# Patient Record
(demographics unavailable — no encounter records)

---

## 2024-10-22 NOTE — REVIEW OF SYSTEMS
[Nasal Congestion] : nasal congestion [Postnasal Drip] : postnasal drip [Cough] : cough [Hay Fever] : hay fever [GERD] : gerd [Negative] : Cardiovascular [Sputum] : no sputum [Dyspnea] : no dyspnea [Wheezing] : no wheezing

## 2024-10-22 NOTE — PHYSICAL EXAM
[No Acute Distress] : no acute distress [Normal Oropharynx] : normal oropharynx [III] : Mallampati Class: III [Normal Appearance] : normal appearance [No Neck Mass] : no neck mass [Normal Rate/Rhythm] : normal rate/rhythm [Normal S1, S2] : normal s1, s2 [No Murmurs] : no murmurs [No Resp Distress] : no resp distress [Clear to Auscultation Bilaterally] : clear to auscultation bilaterally [No Abnormalities] : no abnormalities [No Clubbing] : no clubbing [No Edema] : no edema [Oriented x3] : oriented x3 [Normal Affect] : normal affect

## 2024-10-22 NOTE — HISTORY OF PRESENT ILLNESS
[Never] : never [TextBox_4] : 87 year old woman diagnosed in 2002 with Stage 3A left breast cancer, s/p mastectomy, CAF chemotherapy followed by tamoxifen and anastrozole. In 1/2007 she developed bony metastatic disease and was started on therapy under the care of Dr. Lizbeth Garcia at [facility]. Pathology showed ER+MT+/HER2+ disease. She was initiated on Navelbine and Herceptin with Zometa. In 7/2007, Navelbine was held due to sore legs and she was started on Fulvestrant. At some point exemestane was also started.  Zometa was initially given monthly and then changed to every 3 months. She also had RT to the right femur in 2012 In 2014 she was noted to have cerebellar mass for which she underwent resection and adjuvant radiation therapy. It was decided to maintain her systemic treatment due to lack of systemic progression. In Summer 2015 she developed double vision from cranial nerve paralysis and after eye surgery she has less diplopia.  In May/June 2023 she had fall at home in her apartment in [facility] and found to have fracture of the right clavicle. She spent 6 weeks in rehab facility and as of 7/31/23 moved out of her apartment in [facility] and into assisted living facility ([facility]) in [place]. Transferred her care to [facility] (formerly [facility]) as of 8/2023.    LAST MRI brain - 3/2024 BREANNA LAST PET/CT at [facility] in 6/2022; opting to monitor clinically as no change in tumor markers Echo - at [facility] 11/2022 EF 61%; repeat at [facility] 8/7/23 - EF 59%; noted moderate mitral and tricuspid valve regurg; moderate aortic stenosis; mild/moderate aortic regurg as well; seen by Dr. Jackson who recommended f/up every 6 months (due February 2024) Zometa - every 3 months; last in June 2023- due in 10/2023; advised she will need dental clearance which she has not obtained to date Germline mutation testing - BRCA 1/2 negative in 2007; unsure if updated testing done.  Comes in today for initial evaluation of persistent cough, for several months.  Did not start after a URI.  Sometimes initiated by aspiration in event.  Sometimes feels a tickle in her chest.  No wheezing.  Denies shortness of breath. No prior history of asthma.  Never smoked.  .   No new medications.   Has GERD, worse recently.  Has arunny nose and postnasal drip.

## 2024-12-09 NOTE — END OF VISIT
[] : Fellow [FreeTextEntry3] : Patient seen and examined with heme/onc fellow (Dr. Taurus Burgos PGY- 6)

## 2024-12-09 NOTE — HISTORY OF PRESENT ILLNESS
[Date: ____________] : Patient's last distress assessment performed on [unfilled]. [1 - Distress Level] : Distress Level: 1 [100: Normal, no complaints, no evidence of disease.] : 100: Normal, no complaints, no evidence of disease. [ECOG Performance Status: 1 - Restricted in physically strenuous activity but ambulatory and able to carry out work of a light or sedentary nature] : Performance Status: 1 - Restricted in physically strenuous activity but ambulatory and able to carry out work of a light or sedentary nature, e.g., light house work, office work [de-identified] : Patient initilally diagnosed in 2002 with Stage 3A left breast cancer and after mastectomy treated with CAF chemotherapy followed by tamoxifen and anastrozole. In 1/2007 she developed bony metastatic disease and was started on therapy under the care of Dr. Lizbeth Garcia at [facility]. Pathology showed ER+WV+/HER2+ disease. She was initiated on Navelbine and Herceptin with Zometa. In 7/2007, Navelbine was held due to sore legs and she was started on Fulvestrant. At some point exemestane was also started but it is unclear from office notes. Zometa was initially given monthly and then changed to every 3 months. She also had RT to the right femur in 2012 In 2014 she was noted to have cerebellar mass for which she underwent resection and adjuvant radiation therapy. It was decided to maintain her systemic treatment due to lack of systemic progression. In Summer 2015 she developed double vision from cranial nerve paralysis and after eye surgery she has less diplopia.   In May/June 2023 she had fall at home in her apartment in [facility] and found to have fracture of the right clavicle. She spent 6 weeks in rehab facility and as of 7/31/23 moved out of her apartment in [facility] and into assisted living facility ([facility]) in [place]. Transferred her care to  [facility] (formerly [facility]) as of 8/2023  [de-identified] : 12/5/2024 Patient returns today to rule out progression of metastatic breast cancer and to assess treatment toxicity. on Herceptin/Fulvestrant/exemestane since 2007 s/p mediport replacement - working well  She denies any SOB, CP, bone pain, headache. Recent Imaging: =================== LAST MRI brain - 3/2024 BREANNA LAST PET/CT at [facility] in 6/2022; opting to monitor clinically as no change in tumor markers Echo - Last 8/5/2024: EF 56% (stable)%; noted moderate mitral and tricuspid valve regurg; moderate aortic stenosis; mild/moderate aortic regurg as well; seen by Dr. Jackson who recommended f/up every 6 months Zometa - every 3 months;  Germline mutation testing - BRCA 1/2 negative in 2007; unsure if updated testing done

## 2024-12-09 NOTE — HISTORY OF PRESENT ILLNESS
[Date: ____________] : Patient's last distress assessment performed on [unfilled]. [1 - Distress Level] : Distress Level: 1 [100: Normal, no complaints, no evidence of disease.] : 100: Normal, no complaints, no evidence of disease. [ECOG Performance Status: 1 - Restricted in physically strenuous activity but ambulatory and able to carry out work of a light or sedentary nature] : Performance Status: 1 - Restricted in physically strenuous activity but ambulatory and able to carry out work of a light or sedentary nature, e.g., light house work, office work [de-identified] : Patient initilally diagnosed in 2002 with Stage 3A left breast cancer and after mastectomy treated with CAF chemotherapy followed by tamoxifen and anastrozole. In 1/2007 she developed bony metastatic disease and was started on therapy under the care of Dr. Lizbeth Garcia at [facility]. Pathology showed ER+OR+/HER2+ disease. She was initiated on Navelbine and Herceptin with Zometa. In 7/2007, Navelbine was held due to sore legs and she was started on Fulvestrant. At some point exemestane was also started but it is unclear from office notes. Zometa was initially given monthly and then changed to every 3 months. She also had RT to the right femur in 2012 In 2014 she was noted to have cerebellar mass for which she underwent resection and adjuvant radiation therapy. It was decided to maintain her systemic treatment due to lack of systemic progression. In Summer 2015 she developed double vision from cranial nerve paralysis and after eye surgery she has less diplopia.   In May/June 2023 she had fall at home in her apartment in [facility] and found to have fracture of the right clavicle. She spent 6 weeks in rehab facility and as of 7/31/23 moved out of her apartment in [facility] and into assisted living facility ([facility]) in [place]. Transferred her care to  [facility] [place] (formerly [facility]) as of 8/2023  [de-identified] : 12/5/2024 Patient returns today to rule out progression of metastatic breast cancer and to assess treatment toxicity. on Herceptin/Fulvestrant/exemestane since 2007 s/p mediport replacement - working well  She denies any SOB, CP, bone pain, headache. Recent Imaging: =================== LAST MRI brain - 3/2024 BREANNA LAST PET/CT at [facility] in 6/2022; opting to monitor clinically as no change in tumor markers Echo - Last 8/5/2024: EF 56% (stable)%; noted moderate mitral and tricuspid valve regurg; moderate aortic stenosis; mild/moderate aortic regurg as well; seen by Dr. Jackson who recommended f/up every 6 months Zometa - every 3 months;  Germline mutation testing - BRCA 1/2 negative in 2007; unsure if updated testing done

## 2024-12-09 NOTE — ASSESSMENT
[FreeTextEntry1] : 88 yo woman with history of Stage 3A left breast cancer in 2002; developed metastatic disease in 2007 involving bone and cerebellar mets s/p resection in 2014  - has had stable systemic disease on Herceptin/Fulvestrant/exemestane since 2007 - will continue monthly fulvestrant  - will continue monthly Herceptin (was getting injection of Hylecta; agreed to change to Herceptin infusional 6mg/kg dosing); using medi port, however unable to use mediport last treatment and today because unable to obtain blood return.  She is also c/o soreness when saline in flushed through the port. Will need port revision.  - Echo from 8/24 reviewed; with EF 55-60%.  Follows with DR. Frazier next due in 6 months 2/2025 - Continue Faslodex and Herceptin as well as exemestane  - Annual Brain MRI 3/2024 with BREANNA; will hold off repeat PET/CT unless clinically significant changes or rise in tumor markers - Patient had the opportunity to have all their questions answered to their satisfaction - f/u in 2 months or sooner if needed.  GERD/Epigastric Pain - resolved - continue pantoprazole; H2 blocker; Gaviscon; dietary avoidance of chocolate and tomatoes - f/up with GI

## 2024-12-09 NOTE — ASSESSMENT
[FreeTextEntry1] : 86 yo woman with history of Stage 3A left breast cancer in 2002; developed metastatic disease in 2007 involving bone and cerebellar mets s/p resection in 2014  - has had stable systemic disease on Herceptin/Fulvestrant/exemestane since 2007 - will continue monthly fulvestrant  - will continue monthly Herceptin (was getting injection of Hylecta; agreed to change to Herceptin infusional 6mg/kg dosing); using medi port, however unable to use mediport last treatment and today because unable to obtain blood return.  She is also c/o soreness when saline in flushed through the port. Will need port revision.  - Echo from 8/24 reviewed; with EF 55-60%.  Follows with DR. Frazier next due in 6 months 2/2025 - Continue Faslodex and Herceptin as well as exemestane  - Annual Brain MRI 3/2024 with BREANNA; will hold off repeat PET/CT unless clinically significant changes or rise in tumor markers - Patient had the opportunity to have all their questions answered to their satisfaction - f/u in 2 months or sooner if needed.  GERD/Epigastric Pain - resolved - continue pantoprazole; H2 blocker; Gaviscon; dietary avoidance of chocolate and tomatoes - f/up with GI

## 2024-12-09 NOTE — REVIEW OF SYSTEMS
[Fatigue] : fatigue [Diarrhea: Grade 0] : Diarrhea: Grade 0 [Negative] : Allergic/Immunologic [Abdominal Pain] : no abdominal pain [Constipation] : no constipation [FreeTextEntry2] : weight stable [FreeTextEntry7] : reflux symptoms improved

## 2025-01-30 NOTE — REVIEW OF SYSTEMS
[Fatigue] : fatigue [Abdominal Pain] : no abdominal pain [Constipation] : no constipation [Diarrhea: Grade 0] : Diarrhea: Grade 0 [Negative] : Allergic/Immunologic [FreeTextEntry2] : weight stable [FreeTextEntry7] : reflux symptoms improved [de-identified] : Memory loss

## 2025-01-30 NOTE — HISTORY OF PRESENT ILLNESS
[de-identified] : Patient initilally diagnosed in 2002 with Stage 3A left breast cancer and after mastectomy treated with CAF chemotherapy followed by tamoxifen and anastrozole. In 1/2007 she developed bony metastatic disease and was started on therapy under the care of Dr. Lizbeth Garcia at [facility]. Pathology showed ER+AZ+/HER2+ disease. She was initiated on Navelbine and Herceptin with Zometa. In 7/2007, Navelbine was held due to sore legs and she was started on Fulvestrant. At some point exemestane was also started but it is unclear from office notes. Zometa was initially given monthly and then changed to every 3 months. She also had RT to the right femur in 2012 In 2014 she was noted to have cerebellar mass for which she underwent resection and adjuvant radiation therapy. It was decided to maintain her systemic treatment due to lack of systemic progression. In Summer 2015 she developed double vision from cranial nerve paralysis and after eye surgery she has less diplopia.   In May/June 2023 she had fall at home in her apartment in [facility] and found to have fracture of the right clavicle. She spent 6 weeks in rehab facility and as of 7/31/23 moved out of her apartment in [facility] and into assisted living facility ([facility]) in [place]. Transferred her care to  [facility] (formerly [facility]) as of 8/2023  [de-identified] : 1/30/25 Patient returns today to rule out progression of metastatic breast cancer She denies any SOB, CP, bone pain, headache; has moved to new assisted living facility in [facility] since last visit  Recent Imaging: breast cancer and to assess treatment toxicity. on Herceptin/Fulvestrant/exemestane since 2007 s/p mediport replacement - working well   =================== LAST MRI brain - 3/2024 BREANNA LAST PET/CT at [facility] in 6/2022; opting to monitor clinically as no change in tumor markers Echo - Last 8/5/2024: EF 56% (stable)%; noted moderate mitral and tricuspid valve regurg; moderate aortic stenosis; mild/moderate aortic regurg as well; seen by Dr. Jackson who recommended f/up every 6 months Zometa - every 3 months;  Germline mutation testing - BRCA 1/2 negative in 2007; unsure if updated testing done [Date: ____________] : Patient's last distress assessment performed on [unfilled]. [1 - Distress Level] : Distress Level: 1 [100: Normal, no complaints, no evidence of disease.] : 100: Normal, no complaints, no evidence of disease. [ECOG Performance Status: 1 - Restricted in physically strenuous activity but ambulatory and able to carry out work of a light or sedentary nature] : Performance Status: 1 - Restricted in physically strenuous activity but ambulatory and able to carry out work of a light or sedentary nature, e.g., light house work, office work

## 2025-01-30 NOTE — ASSESSMENT
[FreeTextEntry1] : 86 yo woman with history of Stage 3A left breast cancer in 2002; developed metastatic disease in 2007 involving bone and cerebellar mets s/p resection in 2014  - has had stable systemic disease on Herceptin/Fulvestrant/exemestane since 2007 - will continue monthly fulvestrant  - will continue monthly Herceptin (was getting injection of Hylecta; agreed to change to Herceptin infusional 6mg/kg dosing); using medi port without incident - Echo from 8/24 reviewed; with EF 55-60%.  Follows with DR. Frazier next due in 6 months 2/2025 - Continue Faslodex and Herceptin as well as exemestane  - Annual Brain MRI 3/2024 with BREANNA; will hold off repeat PET/CT unless clinically significant changes or rise in tumor markers - Patient had the opportunity to have all their questions answered to their satisfaction - f/u in 2-3  months or sooner if needed.  GERD/Epigastric Pain - resolved - continue pantoprazole; H2 blocker; Gaviscon; dietary avoidance of chocolate and tomatoes - f/up with GI on as needed basis

## 2025-01-30 NOTE — PHYSICAL EXAM
[Ambulatory and capable of all self care but unable to carry out any work activities] : Status 2- Ambulatory and capable of all self care but unable to carry out any work activities. Up and about more than 50% of waking hours [Thin] : thin [Normal] : affect appropriate [de-identified] : frail [de-identified] : Deferred as patient was seen in treatment room

## 2025-02-22 NOTE — REASON FOR VISIT
[Formal Caregiver] : formal caregiver [FreeTextEntry3] : Dr. Almendarez [FreeTextEntry1] : ------------------------------------------------------------------------ JAY JAY PERES is an 88 year old woman with hypercholesterolemia and stage IV ER+/[place]+/HER2+ breast cancer metastatic since 2007 seen for follow up of aortic stenosis.  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

## 2025-02-22 NOTE — REASON FOR VISIT
[Formal Caregiver] : formal caregiver [FreeTextEntry3] : Dr. Almendarez [FreeTextEntry1] : ------------------------------------------------------------------------ JAY JAY PERES is an 88 year old woman with hypercholesterolemia and stage IV ER+/[place]+/HER2+ breast cancer metastatic since 2007 seen for follow up of aortic stenosis.  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

## 2025-02-22 NOTE — REVIEW OF SYSTEMS
[Heartburn] : heartburn [Dysphagia] : dysphagia [Negative] : Heme/Lymph [Weight Loss (___ Lbs)] : no recent weight loss [SOB] : no shortness of breath [Dyspnea on exertion] : not dyspnea during exertion [Chest Discomfort] : no chest discomfort [Lower Ext Edema] : no extremity edema [Palpitations] : no palpitations [Orthopnea] : no orthopnea [PND] : no PND [Syncope] : no syncope [Cough] : no cough [de-identified] : uses walker for balance

## 2025-02-22 NOTE — REVIEW OF SYSTEMS
[Heartburn] : heartburn [Dysphagia] : dysphagia [Negative] : Heme/Lymph [Weight Loss (___ Lbs)] : no recent weight loss [SOB] : no shortness of breath [Dyspnea on exertion] : not dyspnea during exertion [Chest Discomfort] : no chest discomfort [Lower Ext Edema] : no extremity edema [Palpitations] : no palpitations [Orthopnea] : no orthopnea [PND] : no PND [Syncope] : no syncope [Cough] : no cough [de-identified] : uses walker for balance

## 2025-02-22 NOTE — REVIEW OF SYSTEMS
[Heartburn] : heartburn [Dysphagia] : dysphagia [Negative] : Heme/Lymph [Weight Loss (___ Lbs)] : no recent weight loss [SOB] : no shortness of breath [Dyspnea on exertion] : not dyspnea during exertion [Chest Discomfort] : no chest discomfort [Lower Ext Edema] : no extremity edema [Palpitations] : no palpitations [Orthopnea] : no orthopnea [PND] : no PND [Syncope] : no syncope [Cough] : no cough [de-identified] : uses walker for balance

## 2025-02-22 NOTE — HISTORY OF PRESENT ILLNESS
[FreeTextEntry1] : Interval History: She moved from [place] to [facility] in [place], a greater distance for her to travel now to her doctors. But she continues to feel well and gets around using her walker for balance. She continues to respond well to HER2 targeted therapy.  Her cough is much better, having seen a pulmonologist, and with treatment of esophagitis.  She completed 5 min and 18 sec of treadmill exercise on a modified Yohan protocol in September, with normal HR and BP response to exercise.  Her last echocardiogram for monitoring of trastuzumab and aortic stenosis was completed in 2024.  History: This 87 year old woman with a history of hypercholesterolemia and stage IV ER+/MI+/HER2+ breast cancer metastatic since  was referred for aortic stenosis noted on echocardiogram. She has been maintained on hormonal therapy and trastuzumab since . She previously followed at [facility], but transferred care to [facility] after moving to an assisted living facility in [place]. On routine echocardiogram performed for administration of HER2 targeted therapy at the [facility] there was moderate to severe aortic stenosis and moderate MR with normal LV systolic function and GLS. Her prior echo was done in 2022 at [place].  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 Has fallen, but no syncope. The patient does not have chest pain or edema.  She lives in [facility] assisted living in [place]. An aide helps her shower and dress, but she performs other ADLs independently.  She is affected by cough/reflux like symptoms which are chronic. An endoscopy performed at [facility] showed mild esophagitis.  She had no prior chest wall radiation.  2024: Currently has difficulty swallowing and feels good gets stuck in throat. Pantoprazole and treatment for thrush were not helpful. This is a significant quality of life issue for her at present and endoscopy was scheduled on  to evaluate. She denies change in exercise tolerance or shortness of breath.  2024: Feels well but continues to be affected by dysphagia and cough. Saw Dr. Choudhury and endoscopy showed only mild gastro-esophagitis. He prescribed medications which she reports helped a little. She reports no chest pain, shortness of breath, palpitations, edema, or change in exercise tolerance. She met with Dr. Deshpande from CT surgery and an exercise stress test was recommended to assess for symptoms of aortic stenosis. This is scheduled.   2024: She has not scheduled the exercise stress test recommended by Dr. Deshpande, but reports feeling fine aside from persistent cough which bothers her. She has seen GI and is being treated for esophagitis and hiatus hernia with Schatzki ring. However, cough not much better, and it was suggested that she see a pulmonologist.  Her exercise tolerance is no different. In fact, her aide states that she moves quite quickly with her walker. The echocardiogram performed on 2024 showed normal LVEF and GLS, and aortic stenosis with mean gradient 25.5 and peak velocity of 3.5 corresponding to an estimated JOAQUIM of 1.3 by continuity equation - these findings are stable compared to prior.   Cardiovascular Summary: ---------------------------------------------- EC2025: NSR 75 bpm, LAE, LVH 2024: NSR 71 bpm, LVH, LAE 2024: NSR 77 bpm, possible LAE, LVH 2024: NSR 80 bpm and LVH, LAE 10/27/2023: NSR 73 bpm, possible LAE, LVH 8/3/2023: NSR 72 bpm, RAD, LVH, septal infarct, old ---------------------------------------------- Echo: 2024: EF 56 %, GLS -17.9, mod MR, LAE, mod-AS, peak velocity 3.5, mean gradient 25.5 2024: EF 55 %, GLS -16, moderate AS (mean gradient 24.7, peak 37.9) 2023: EF 59 %, mod MR, mod TR, mild-mod AR, GLS -19.2, mod- AS (mean gradient 25), JOAQUIM 0.80 2022: LVEF 61 %, MAC, calcified aortic valve 2021: moderate AS, mild-moderate MR, 2+ AR, JOAQUIM 1.2 cm, mean gradient 22 ---------------------------------------------- Stress: 2024: 5 min 18 sec on modified Yohan protocol (3.4 METS), normal HR & BP response ---------------------------------------------- CT chest 2022: mild cardiomegaly w dilated LA and LV, coronary atherosclerosis, calcified aortic valve leaflets

## 2025-02-22 NOTE — HISTORY OF PRESENT ILLNESS
[FreeTextEntry1] : Interval History: She moved from [place] to [facility] in [place], a greater distance for her to travel now to her doctors. But she continues to feel well and gets around using her walker for balance. She continues to respond well to HER2 targeted therapy.  Her cough is much better, having seen a pulmonologist, and with treatment of esophagitis.  She completed 5 min and 18 sec of treadmill exercise on a modified Yohan protocol in September, with normal HR and BP response to exercise.  Her last echocardiogram for monitoring of trastuzumab and aortic stenosis was completed in 2024.  History: This 87 year old woman with a history of hypercholesterolemia and stage IV ER+/MS+/HER2+ breast cancer metastatic since  was referred for aortic stenosis noted on echocardiogram. She has been maintained on hormonal therapy and trastuzumab since . She previously followed at [facility], but transferred care to [facility] after moving to an assisted living facility in [place]. On routine echocardiogram performed for administration of HER2 targeted therapy at the [facility] there was moderate to severe aortic stenosis and moderate MR with normal LV systolic function and GLS. Her prior echo was done in 2022 at [place].  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 Has fallen, but no syncope. The patient does not have chest pain or edema.  She lives in [facility] assisted living in [place]. An aide helps her shower and dress, but she performs other ADLs independently.  She is affected by cough/reflux like symptoms which are chronic. An endoscopy performed at [facility] showed mild esophagitis.  She had no prior chest wall radiation.  2024: Currently has difficulty swallowing and feels good gets stuck in throat. Pantoprazole and treatment for thrush were not helpful. This is a significant quality of life issue for her at present and endoscopy was scheduled on  to evaluate. She denies change in exercise tolerance or shortness of breath.  2024: Feels well but continues to be affected by dysphagia and cough. Saw Dr. Choudhury and endoscopy showed only mild gastro-esophagitis. He prescribed medications which she reports helped a little. She reports no chest pain, shortness of breath, palpitations, edema, or change in exercise tolerance. She met with Dr. Deshpande from CT surgery and an exercise stress test was recommended to assess for symptoms of aortic stenosis. This is scheduled.   2024: She has not scheduled the exercise stress test recommended by Dr. Deshpande, but reports feeling fine aside from persistent cough which bothers her. She has seen GI and is being treated for esophagitis and hiatus hernia with Schatzki ring. However, cough not much better, and it was suggested that she see a pulmonologist.  Her exercise tolerance is no different. In fact, her aide states that she moves quite quickly with her walker. The echocardiogram performed on 2024 showed normal LVEF and GLS, and aortic stenosis with mean gradient 25.5 and peak velocity of 3.5 corresponding to an estimated JOAQUIM of 1.3 by continuity equation - these findings are stable compared to prior.   Cardiovascular Summary: ---------------------------------------------- EC2025: NSR 75 bpm, LAE, LVH 2024: NSR 71 bpm, LVH, LAE 2024: NSR 77 bpm, possible LAE, LVH 2024: NSR 80 bpm and LVH, LAE 10/27/2023: NSR 73 bpm, possible LAE, LVH 8/3/2023: NSR 72 bpm, RAD, LVH, septal infarct, old ---------------------------------------------- Echo: 2024: EF 56 %, GLS -17.9, mod MR, LAE, mod-AS, peak velocity 3.5, mean gradient 25.5 2024: EF 55 %, GLS -16, moderate AS (mean gradient 24.7, peak 37.9) 2023: EF 59 %, mod MR, mod TR, mild-mod AR, GLS -19.2, mod- AS (mean gradient 25), JOAQUIM 0.80 2022: LVEF 61 %, MAC, calcified aortic valve 2021: moderate AS, mild-moderate MR, 2+ AR, JOAQUIM 1.2 cm, mean gradient 22 ---------------------------------------------- Stress: 2024: 5 min 18 sec on modified Yohan protocol (3.4 METS), normal HR & BP response ---------------------------------------------- CT chest 2022: mild cardiomegaly w dilated LA and LV, coronary atherosclerosis, calcified aortic valve leaflets

## 2025-02-22 NOTE — REASON FOR VISIT
[Formal Caregiver] : formal caregiver [FreeTextEntry3] : Dr. Almendarez [FreeTextEntry1] : ------------------------------------------------------------------------ JAY JAY PERES is an 88 year old woman with hypercholesterolemia and stage IV ER+/MI+/HER2+ breast cancer metastatic since 2007 seen for follow up of aortic stenosis.  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

## 2025-02-22 NOTE — PHYSICAL EXAM
[Well Developed] : well developed [Well Nourished] : well nourished [No Acute Distress] : no acute distress [Normal Conjunctiva] : normal conjunctiva [Normal Venous Pressure] : normal venous pressure [Normal S1, S2] : normal S1, S2 [Murmur] : murmur [Clear Lung Fields] : clear lung fields [Good Air Entry] : good air entry [No Respiratory Distress] : no respiratory distress  [Gait - Sufficient for Exercise Testing] : gait - sufficient for exercise testing [No Edema] : no edema [No Cyanosis] : no cyanosis [No Clubbing] : no clubbing [Moves all extremities] : moves all extremities [Normal Speech] : normal speech [Alert and Oriented] : alert and oriented [Normal memory] : normal memory [de-identified] : systolic murmur transmitted to carotids.  [de-identified] : systolic

## 2025-02-22 NOTE — PHYSICAL EXAM
[Well Developed] : well developed [Well Nourished] : well nourished [No Acute Distress] : no acute distress [Normal Conjunctiva] : normal conjunctiva [Normal Venous Pressure] : normal venous pressure [Normal S1, S2] : normal S1, S2 [Murmur] : murmur [Clear Lung Fields] : clear lung fields [Good Air Entry] : good air entry [No Respiratory Distress] : no respiratory distress  [Gait - Sufficient for Exercise Testing] : gait - sufficient for exercise testing [No Edema] : no edema [No Cyanosis] : no cyanosis [No Clubbing] : no clubbing [Moves all extremities] : moves all extremities [Normal Speech] : normal speech [Alert and Oriented] : alert and oriented [Normal memory] : normal memory [de-identified] : systolic [de-identified] : systolic murmur transmitted to carotids.

## 2025-02-22 NOTE — PHYSICAL EXAM
[Well Developed] : well developed [Well Nourished] : well nourished [No Acute Distress] : no acute distress [Normal Conjunctiva] : normal conjunctiva [Normal Venous Pressure] : normal venous pressure [Normal S1, S2] : normal S1, S2 [Murmur] : murmur [Clear Lung Fields] : clear lung fields [Good Air Entry] : good air entry [No Respiratory Distress] : no respiratory distress  [Gait - Sufficient for Exercise Testing] : gait - sufficient for exercise testing [No Edema] : no edema [No Cyanosis] : no cyanosis [No Clubbing] : no clubbing [Moves all extremities] : moves all extremities [Normal Speech] : normal speech [Alert and Oriented] : alert and oriented [Normal memory] : normal memory [de-identified] : systolic murmur transmitted to carotids.  [de-identified] : systolic

## 2025-02-22 NOTE — ASSESSMENT
[FreeTextEntry1] : ================================== 89 year old woman with stage IV breast cancer metastatic to bone and brain, stable on systemic therapy for many years now, moderate calcific aortic stenosis which has progressed since 2021, but stable over past year and without functional or quality of life limiting symptoms at present. Evaluated for TAVR by Dr. Deshpande in 2024 and an exercise stress test was recommended to assess symptoms objectively.  Given overall stable breast cancer and good quality of life, would benefit from TAVR, though there is no urgent need for intervention currently.    # Aortic stenosis: moderate. - discussed symptoms and indications for valve replacement - We discussed recent evidence supporting intervention for asymptomatic severe aortic stenosis.  - repeat echocardiogram now - will coordinate evaluation by structural team  # Hypercholesterolemia: - Continue simvastatin  # Dysphagia - seen by GI and improved somewhat with treatment. May be related to prior thoracic radiation, though details are not available. Chest is clear to auscultation.   # Cough, persistent, likely related to esophagitis above. Has seen Dr. Velez from Pulmonary and notes improvement.  # Risk for treatment related cardiac dysfunction: - continue surveillance by echocardiogram with strain, every 6 months is reasonable given long term therapy  - schedule repeat echocardiogram now  Follow up in 6 months with me  Above discussed with the patient and all questions answered to the best of my ability and to her apparent satisfaction.

## 2025-02-22 NOTE — HISTORY OF PRESENT ILLNESS
[FreeTextEntry1] : Interval History: She moved from [place] to [facility] in [place], a greater distance for her to travel now to her doctors. But she continues to feel well and gets around using her walker for balance. She continues to respond well to HER2 targeted therapy.  Her cough is much better, having seen a pulmonologist, and with treatment of esophagitis.  She completed 5 min and 18 sec of treadmill exercise on a modified Yohan protocol in September, with normal HR and BP response to exercise.  Her last echocardiogram for monitoring of trastuzumab and aortic stenosis was completed in 2024.  History: This 87 year old woman with a history of hypercholesterolemia and stage IV ER+/KY+/HER2+ breast cancer metastatic since  was referred for aortic stenosis noted on echocardiogram. She has been maintained on hormonal therapy and trastuzumab since . She previously followed at [facility], but transferred care to [facility] after moving to an assisted living facility in [place]. On routine echocardiogram performed for administration of HER2 targeted therapy at the [facility] there was moderate to severe aortic stenosis and moderate MR with normal LV systolic function and GLS. Her prior echo was done in 2022 at [place].  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 Has fallen, but no syncope. The patient does not have chest pain or edema.  She lives in [facility] assisted living in [place]. An aide helps her shower and dress, but she performs other ADLs independently.  She is affected by cough/reflux like symptoms which are chronic. An endoscopy performed at [facility] showed mild esophagitis.  She had no prior chest wall radiation.  2024: Currently has difficulty swallowing and feels good gets stuck in throat. Pantoprazole and treatment for thrush were not helpful. This is a significant quality of life issue for her at present and endoscopy was scheduled on  to evaluate. She denies change in exercise tolerance or shortness of breath.  2024: Feels well but continues to be affected by dysphagia and cough. Saw Dr. Choudhury and endoscopy showed only mild gastro-esophagitis. He prescribed medications which she reports helped a little. She reports no chest pain, shortness of breath, palpitations, edema, or change in exercise tolerance. She met with Dr. Deshpande from CT surgery and an exercise stress test was recommended to assess for symptoms of aortic stenosis. This is scheduled.   2024: She has not scheduled the exercise stress test recommended by Dr. Deshpande, but reports feeling fine aside from persistent cough which bothers her. She has seen GI and is being treated for esophagitis and hiatus hernia with Schatzki ring. However, cough not much better, and it was suggested that she see a pulmonologist.  Her exercise tolerance is no different. In fact, her aide states that she moves quite quickly with her walker. The echocardiogram performed on 2024 showed normal LVEF and GLS, and aortic stenosis with mean gradient 25.5 and peak velocity of 3.5 corresponding to an estimated JOAQUIM of 1.3 by continuity equation - these findings are stable compared to prior.   Cardiovascular Summary: ---------------------------------------------- EC2025: NSR 75 bpm, LAE, LVH 2024: NSR 71 bpm, LVH, LAE 2024: NSR 77 bpm, possible LAE, LVH 2024: NSR 80 bpm and LVH, LAE 10/27/2023: NSR 73 bpm, possible LAE, LVH 8/3/2023: NSR 72 bpm, RAD, LVH, septal infarct, old ---------------------------------------------- Echo: 2024: EF 56 %, GLS -17.9, mod MR, LAE, mod-AS, peak velocity 3.5, mean gradient 25.5 2024: EF 55 %, GLS -16, moderate AS (mean gradient 24.7, peak 37.9) 2023: EF 59 %, mod MR, mod TR, mild-mod AR, GLS -19.2, mod- AS (mean gradient 25), JOAQUIM 0.80 2022: LVEF 61 %, MAC, calcified aortic valve 2021: moderate AS, mild-moderate MR, 2+ AR, JOAQUIM 1.2 cm, mean gradient 22 ---------------------------------------------- Stress: 2024: 5 min 18 sec on modified Yohan protocol (3.4 METS), normal HR & BP response ---------------------------------------------- CT chest 2022: mild cardiomegaly w dilated LA and LV, coronary atherosclerosis, calcified aortic valve leaflets

## 2025-03-27 NOTE — ASSESSMENT
[FreeTextEntry1] : 89 yo woman with history of Stage 3A left breast cancer in 2002; developed metastatic disease in 2007 involving bone and cerebellar mets s/p resection in 2014  - has had stable systemic disease on Herceptin/Fulvestrant/exemestane since 2007 - will continue monthly fulvestrant  - will continue monthly Herceptin (was getting injection of Hylecta; agreed to change to Herceptin infusional 6mg/kg dosing); using medi port without incident - Echo from 8/24 reviewed; with EF 55-60%.  3/21/25 with EF 72%; Follows with DR. Frazier - Continue Faslodex and Herceptin as well as exemestane  - Annual Brain MRI 3/2024 with BREANNA; will hold off repeat PET/CT unless clinically significant changes or rise in tumor markers - Zolendronic acid with dose reduction due to renal insufficiency - 3.5mg IV today; to repeat in 9/2025 - Patient had the opportunity to have all their questions answered to their satisfaction  GERD/Epigastric Pain - resolved - continue pantoprazole; H2 blocker; Gaviscon; dietary avoidance of chocolate and tomatoes - f/up with GI on as needed basis  - continue monthly treatments - will arrange for office visit at 2 month intervals.

## 2025-03-27 NOTE — PHYSICAL EXAM
[Ambulatory and capable of all self care but unable to carry out any work activities] : Status 2- Ambulatory and capable of all self care but unable to carry out any work activities. Up and about more than 50% of waking hours [Thin] : thin [Normal] : affect appropriate [de-identified] : frail [de-identified] : Deferred as patient was seen in treatment room

## 2025-03-27 NOTE — HISTORY OF PRESENT ILLNESS
[de-identified] : Patient initilally diagnosed in 2002 with Stage 3A left breast cancer and after mastectomy treated with CAF chemotherapy followed by tamoxifen and anastrozole. In 1/2007 she developed bony metastatic disease and was started on therapy under the care of Dr. Lizbeth Garcia at [facility]. Pathology showed ER+WY+/HER2+ disease. She was initiated on Navelbine and Herceptin with Zometa. In 7/2007, Navelbine was held due to sore legs and she was started on Fulvestrant. At some point exemestane was also started but it is unclear from office notes. Zometa was initially given monthly and then changed to every 3 months. She also had RT to the right femur in 2012 In 2014 she was noted to have cerebellar mass for which she underwent resection and adjuvant radiation therapy. It was decided to maintain her systemic treatment due to lack of systemic progression. In Summer 2015 she developed double vision from cranial nerve paralysis and after eye surgery she has less diplopia.   In May/June 2023 she had fall at home in her apartment in [place] [place] and found to have fracture of the right clavicle. She spent 6 weeks in rehab facility and as of 7/31/23 moved out of her apartment in [place] and into assisted living facility ([facility]) in [place]. Transferred her care to  [facility] ([facility]) as of 8/2023  [de-identified] : 3/27/25 Patient returns today to rule out progression or recurrence of breast cancer and to assess treatment toxicity. In 1/2025, moved to new assisted living facility in [facility];  In 2/2025, had fall (no fractures) where she was in her bathroom pinned to the ground for several hours; She also contracted respiratory illness (flu?); her assisted living facility was on "lock-down" for 7-10 days due to infectious outbreak She is having difficulty coping with the changes in her life Also is stressed as she was instructed by Dr. Jackson to consider surgical repair of her critical AS.  on Herceptin/Fulvestrant/exemestane since 2007 s/p mediport replacement - working well   =================== LAST MRI brain - 3/2024 BREANNA LAST PET/CT at [facility] in 6/2022; opting to monitor clinically as no change in tumor markers Echo - Last 8/5/2024: EF 56% (stable)%; noted moderate mitral and tricuspid valve regurg; moderate aortic stenosis; mild/moderate aortic regurg as well; seen by Dr. Jackson who recommended f/up every 6 months Zometa - every 6 months; 8/2024, 3/2025 Germline mutation testing - BRCA 1/2 negative in 2007; unsure if updated testing done [Date: ____________] : Patient's last distress assessment performed on [unfilled]. [1 - Distress Level] : Distress Level: 1 [90: Able to carry normal activity; minor signs or symptoms of disease.] : 90: Able to carry normal activity; minor signs or symptoms of disease.  [ECOG Performance Status: 1 - Restricted in physically strenuous activity but ambulatory and able to carry out work of a light or sedentary nature] : Performance Status: 1 - Restricted in physically strenuous activity but ambulatory and able to carry out work of a light or sedentary nature, e.g., light house work, office work

## 2025-03-27 NOTE — REVIEW OF SYSTEMS
[Fatigue] : fatigue [Abdominal Pain] : no abdominal pain [Constipation] : no constipation [Diarrhea: Grade 0] : Diarrhea: Grade 0 [Negative] : Allergic/Immunologic [FreeTextEntry2] : weight stable [FreeTextEntry7] : reflux symptoms improved [de-identified] : Memory loss; poor balance [de-identified] : overwhelmed by illnesses (recent flu, critical AS)

## 2025-04-07 NOTE — HISTORY OF PRESENT ILLNESS
[FreeTextEntry1] : Ms. Ballard is an 88 year-old female who is being referred to our clinic today by Dr. Juárez for evaluation of her Aortic Stenosis. She has a past medical history of Hypothyroid, Breast Ca, Anxiety, GERD, and HLD.  Today she presents  She had a Transthoracic Echocardiogram which was evaluated with Dr. Rhianna Amezcua, and demonstrates    NYHA Classification: STS 30 day Mortality Risk Score: 13.1% [Dyslipidemia] : Dyslipidemia [Hypertension] : Hypertension [Heart Failure within 2 Weeks] : Heart Failure in last 2 weeks [Class III] : Class III [Prior Heart Failure] : Prior Heart Failure

## 2025-04-07 NOTE — REASON FOR VISIT
[Structural Heart and Valve Disease] : structural heart and valve disease [FreeTextEntry3] : Dr. Juárez

## 2025-04-11 NOTE — ASSESSMENT
[FreeTextEntry1] : 88 year old female with Severe AS.  Discussed the concerns for TAVR including frailty, frequent falls, and compromised functional status. Discussed with Dr. Tan.  Will proceed with TAVR CT and re-evaluate after CT is done. She will need to touch base with her Oncologist to discuss their impressions of her prognosis with metastatic breast cancer (on maintenance therapy) and risk of intervention.   Plan:  - TAVR CT

## 2025-04-11 NOTE — HISTORY OF PRESENT ILLNESS
[FreeTextEntry1] : Ms. Ballard is an 88 year-old female who is being referred to our clinic today by Dr. Juárez for evaluation of her Aortic Stenosis. She has a past medical history of Hypothyroid, Breast Ca, Anxiety, GERD, and HLD.  Today she presents feeling tired. She learned about her AS for about a year, which has progressed. Up until a few weeks ago she was feeling ok, but then she began feeling very SOB with minimal activity and extreme fatigue. She recently had a few mechanical fall, but no syncope. She states she has no strength to stand on her own and almost fell taking her jacket off in our office. She denies any CP or pedal edema. she does have balance issues. She lives in an assisted living and needs assistance with her ADL's. She recently had COVID and a UTI. She sleeps with 2 pillows at night and has never woken up with SOB. She was never a smoker but was exposed to second hand smoke. She also notes that she coughs whenever she drinks.  She had a Transthoracic Echocardiogram which was evaluated with Dr. Rhianna Amezcua, and demonstrates Severe AS (likely trileaflet) JOAQUIM= 0.82cm2, DVI= 0.26.  Peak/mean gradeints= 59/34mmHg, peak velocity= 3.8m/s.  Mild AI, MAC with moderate MR, Normal biventricular function.     NYHA Classification: III STS 30 day Mortality Risk Score: 13.1%

## 2025-04-11 NOTE — REVIEW OF SYSTEMS
[Chest Pain] : no chest pain [Palpitations] : no palpitations [Leg Claudication] : no intermittent leg claudication [Lower Ext Edema] : no lower extremity edema [Cough] : no cough [Orthopnea] : no orthopnea [FreeTextEntry7] : Abdominal cramping, decreased appetite [de-identified] : Balance issues, ambulates with walker

## 2025-04-11 NOTE — REVIEW OF SYSTEMS
[Chest Pain] : no chest pain [Palpitations] : no palpitations [Leg Claudication] : no intermittent leg claudication [Lower Ext Edema] : no lower extremity edema [Cough] : no cough [Orthopnea] : no orthopnea [FreeTextEntry7] : Abdominal cramping, decreased appetite [de-identified] : Balance issues, ambulates with walker

## 2025-04-11 NOTE — REVIEW OF SYSTEMS
[Chest Pain] : no chest pain [Palpitations] : no palpitations [Leg Claudication] : no intermittent leg claudication [Lower Ext Edema] : no lower extremity edema [Cough] : no cough [Orthopnea] : no orthopnea [FreeTextEntry7] : Abdominal cramping, decreased appetite [de-identified] : Balance issues, ambulates with walker

## 2025-04-24 NOTE — ASSESSMENT
[FreeTextEntry1] : 89 yo woman with history of Stage 3A left breast cancer in 2002; developed metastatic disease in 2007 involving bone and cerebellar mets s/p resection in 2014  - has had stable systemic disease on Herceptin/Fulvestrant/exemestane since 2007 - will continue monthly fulvestrant  - will continue monthly Herceptin (was getting injection of Hylecta; agreed to change to Herceptin infusional 6mg/kg dosing); using medi port without incident - Echo from 8/24 reviewed; with EF 55-60%.  3/21/25 with EF 72%; Follows with DR. Frazier - Continue exemestane  - recent Brain MRI 4/2025 with BREANNA; will hold off repeat PET/CT unless clinically significant changes or rise in tumor markers - Zolendronic acid with dose reduction due to renal insufficiency - 3.5mg IV today; to repeat in 9/2025 - Patient had the opportunity to have all their questions answered to their satisfaction  GERD/Epigastric Pain - resolved - continue pantoprazole; H2 blocker; Gaviscon; dietary avoidance of chocolate and tomatoes - f/up with GI on as needed basis  - continue monthly treatments - will arrange for office visit at 2-month intervals. [Palliative] : Goals of care discussed with patient: Palliative

## 2025-04-24 NOTE — REASON FOR VISIT
[Follow-Up Visit] : a follow-up [Formal Caregiver] : formal caregiver [FreeTextEntry2] : metastatic Breast cancer

## 2025-04-24 NOTE — PHYSICAL EXAM
[Ambulatory and capable of all self care but unable to carry out any work activities] : Status 2- Ambulatory and capable of all self care but unable to carry out any work activities. Up and about more than 50% of waking hours [Thin] : thin [Normal] : affect appropriate [de-identified] : Deferred as patient was seen in treatment room

## 2025-04-24 NOTE — HISTORY OF PRESENT ILLNESS
[Date: ____________] : Patient's last distress assessment performed on [unfilled]. [1 - Distress Level] : Distress Level: 1 [90: Able to carry normal activity; minor signs or symptoms of disease.] : 90: Able to carry normal activity; minor signs or symptoms of disease.  [ECOG Performance Status: 1 - Restricted in physically strenuous activity but ambulatory and able to carry out work of a light or sedentary nature] : Performance Status: 1 - Restricted in physically strenuous activity but ambulatory and able to carry out work of a light or sedentary nature, e.g., light house work, office work [de-identified] : Patient initilally diagnosed in 2002 with Stage 3A left breast cancer and after mastectomy treated with CAF chemotherapy followed by tamoxifen and anastrozole. In 1/2007 she developed bony metastatic disease and was started on therapy under the care of Dr. Lizbeth Garcia at [facility]. Pathology showed ER+MT+/HER2+ disease. She was initiated on Navelbine and Herceptin with Zometa. In 7/2007, Navelbine was held due to sore legs and she was started on Fulvestrant. At some point exemestane was also started but it is unclear from office notes. Zometa was initially given monthly and then changed to every 3 months. She also had RT to the right femur in 2012 In 2014 she was noted to have cerebellar mass for which she underwent resection and adjuvant radiation therapy. It was decided to maintain her systemic treatment due to lack of systemic progression. In Summer 2015 she developed double vision from cranial nerve paralysis and after eye surgery she has less diplopia.   In May/June 2023 she had fall at home in her apartment in [facility] and found to have fracture of the right clavicle. She spent 6 weeks in rehab facility and as of 7/31/23 moved out of her apartment in [facility] and into assisted living facility ([facility]) in [place]. Transferred her care to  [facility] (formerly [facility]) as of 8/2023  [de-identified] : 4/24/25 Patient returns today to rule out progression or recurrence of breast cancer and to assess treatment toxicity. On Herceptin/Fulvestrant/exemestane since 2007, continues to tolerate well.   In 1/2025, moved to new assisted living facility in [facility];  In 2/2025, had fall (no fractures) where she was in her bathroom pinned to the ground for several hours; She also contracted COVID; her assisted living facility was on "lock-down" for 7-10 days due to infectious outbreak She states she is feeling better from last visit.  She has more energy.  She is trying to eat more.  She denies any sob, cp, n/v, diarrhea.  She c/o occasional constipation, which she is managing.  She is overwhelmed with all the doctors apt she needs to go to.  Also is stressed as she was instructed by Dr. Jackson to consider surgical repair of her critical AS.  =================== MRI brain, 4/18/25- IMPRESSION: No evidence of intracranial metastatic disease. Multiple unchanged chronic intracranial findings, as discussed.  MRI brain - 3/2024 BREANNA LAST PET/CT at [facility] in 6/2022; opting to monitor clinically as no change in tumor markers Echo - Last 8/5/2024: EF 56% (stable)%; noted moderate mitral and tricuspid valve regurg; moderate aortic stenosis; mild/moderate aortic regurg as well; seen by Dr. Jackson who recommended f/up every 6 months Zometa - every 6 months; 8/2024, 3/2025 Germline mutation testing - BRCA 1/2 negative in 2007; unsure if updated testing done

## 2025-04-24 NOTE — REVIEW OF SYSTEMS
[Fatigue] : fatigue [Diarrhea: Grade 0] : Diarrhea: Grade 0 [Negative] : Allergic/Immunologic [Abdominal Pain] : no abdominal pain [Constipation] : no constipation [FreeTextEntry2] : weight stable [FreeTextEntry7] : reflux symptoms improved [de-identified] : Memory loss; poor balance [de-identified] : overwhelmed by illnesses (recent flu, critical AS)

## 2025-06-19 NOTE — DISCUSSION/SUMMARY
[FreeTextEntry1] : Ms. PERES has symptomatic severe AS. Her testing is completed, she has no CAD, and by CT her anatomy is suitable for TF TAVR. She would like to think about it tonight and will call the office tomorrow--she is definitely leaning towards proceeding with TAVR, in which case she will call to schedule. She is unhappy with her QOL, mostly her diminished level of energy and decline in functional capacity. While we had a transparent discussion that her symptoms were likely multifactorial in etiology given her non-cardiac comorbidities, I would expect there to be some improvement after TAVR. It would be ideal if we can exclude the possibility of any GI bleeding (this work up is underway) and, to whatever extent possible, address her anemia prior to any intervention--she is planned for IV iron, which I fully supported. All of her and her daughters' questions were answered in detail, and they are comfortable with the management plan as outlined above.

## 2025-06-19 NOTE — HISTORY OF PRESENT ILLNESS
[FreeTextEntry1] : Ms. Ballard is being evaluated in regard to her Aortic Stenosis and returns today with her 2 daughters to discuss the procedure in more detail--specifically the potential risks, benefits, expectations regarding recovery, and what she may reasonably expect in regard to symptomatic improvement. Her testing for TAVR is complete, and she would like to review those results in more detail as well.  Since we last saw her in April, she is doing better, stating that she has more energy--though still not as much as she would like. She notes having had a difficult recovery from COVID at that visit. She states that she is not walking as much as she used to, not going as far and as long as she used to. She states she stops due to fatigue, and denies any SOB, CP or pedal edema. She has had no hospitalizations since we last saw her.  There have been no significant changes to her medications since her last visit. She is starting PO iron from her Hematologist for her chronic anemia. She has noticed some dark stools, for which she is being checked for stool guaiac. She notes "I get tired more easily" but reports "not really" with respect to NAIR. She has no angina, dizziness, or syncope. Her appetite is "I eat because the food is there and put in front of me" but her weight is "fairly stable". She does have some issues with constipation, for which Senna was added and MiraLAX prn.   NYHA Class II

## 2025-06-19 NOTE — REVIEW OF SYSTEMS
[Feeling Fatigued] : feeling fatigued [Negative] : Psychiatric [SOB] : no shortness of breath [Dyspnea on exertion] : not dyspnea during exertion [Chest Discomfort] : no chest discomfort [Lower Ext Edema] : no extremity edema [de-identified] : Iron Deficiency Anemia

## 2025-06-19 NOTE — PHYSICAL EXAM
[Frail] : frail [Normal Venous Pressure] : normal venous pressure [Normal S1, S2] : normal S1, S2 [Murmur] : murmur [Clear Lung Fields] : clear lung fields [Good Air Entry] : good air entry [Soft] : abdomen soft [Normal Bowel Sounds] : normal bowel sounds [Normal] : moves all extremities, no focal deficits, normal speech [Alert and Oriented] : alert and oriented [Normal memory] : normal memory [de-identified] : II/VI RORY LUSB/RUSB [de-identified] : Using a rolling walker [de-identified] : trace edema bilaterally

## 2025-06-19 NOTE — REVIEW OF SYSTEMS
[Feeling Fatigued] : feeling fatigued [Negative] : Psychiatric [SOB] : no shortness of breath [Dyspnea on exertion] : not dyspnea during exertion [Chest Discomfort] : no chest discomfort [Lower Ext Edema] : no extremity edema [de-identified] : Iron Deficiency Anemia

## 2025-06-19 NOTE — PHYSICAL EXAM
[Frail] : frail [Normal Venous Pressure] : normal venous pressure [Normal S1, S2] : normal S1, S2 [Murmur] : murmur [Clear Lung Fields] : clear lung fields [Good Air Entry] : good air entry [Soft] : abdomen soft [Normal Bowel Sounds] : normal bowel sounds [Normal] : moves all extremities, no focal deficits, normal speech [Alert and Oriented] : alert and oriented [Normal memory] : normal memory [de-identified] : II/VI RORY LUSB/RUSB [de-identified] : Using a rolling walker [de-identified] : trace edema bilaterally

## 2025-06-30 NOTE — REVIEW OF SYSTEMS
[Fatigue] : fatigue [Diarrhea: Grade 0] : Diarrhea: Grade 0 [Negative] : Allergic/Immunologic [Abdominal Pain] : no abdominal pain [Constipation] : no constipation [FreeTextEntry2] : weight stable [FreeTextEntry7] : reflux symptoms improved [de-identified] : Memory loss; poor balance [de-identified] : overwhelmed by illnesses (recent flu, critical AS)

## 2025-06-30 NOTE — ASSESSMENT
[Palliative] : Goals of care discussed with patient: Palliative [FreeTextEntry1] : 87 yo woman with history of Stage 3A left breast cancer in 2002; developed metastatic disease in 2007 involving bone and cerebellar mets s/p resection in 2014  Metastatic breast cancer - has had stable systemic disease on Herceptin/Fulvestrant/exemestane since 2007 - will continue monthly fulvestrant  - will continue monthly Herceptin (was getting injection of Hylecta; agreed to change to Herceptin infusional 6mg/kg dosing); using medi port without incident - Echo from 8/24 reviewed; with EF 55-60%.  3/21/25 with EF 72%; Follows with DR. Frazier - Continue exemestane  - recent Brain MRI 4/2025 with BREANNA; will hold off repeat PET/CT unless clinically significant changes or rise in tumor markers - Zolendronic acid with dose reduction due to renal insufficiency - 3.5mg IV today; to repeat in 9/2025  GERD/Epigastric Pain/iron deficiency - continue pantoprazole; H2 blocker; Gaviscon; dietary avoidance of chocolate and tomatoes - f/up with GI on as needed basis - suspected upper GI blood loss with slowly declining Hg level and microcytosis - will plan for IV iron therapy to optimize her for cardiac procedure and potentiate eyrthrocytosis - scheduled for next week and again on 7/17/25 - will check labs today including CBC/Chem panel/iron studies - will also evaluate for hemolysis  - continue monthly treatments for breast cancer - 1 week

## 2025-06-30 NOTE — REVIEW OF SYSTEMS
unchanged [Fatigue] : fatigue [Diarrhea: Grade 0] : Diarrhea: Grade 0 [Negative] : Allergic/Immunologic [Abdominal Pain] : no abdominal pain [Constipation] : no constipation [FreeTextEntry2] : weight stable [FreeTextEntry7] : reflux symptoms improved [de-identified] : Memory loss; poor balance [de-identified] : overwhelmed by illnesses (recent flu, critical AS)

## 2025-06-30 NOTE — ADDENDUM
[FreeTextEntry1] :  I, Eulogio Pham, affirm that I have recorded for Dr. Almendarez on 06/30/2025 to the best of my ability. All medical record entries scribed were at my, Dr. Almendarez's, direction on 06/30/2025 . I have reviewed the chart and agree that the record accurately reflects my personal performance of the history, physical exam, assessment and plan. I have also personally directed, reviewed, and agree with the discharge instructions.

## 2025-06-30 NOTE — PHYSICAL EXAM
[Ambulatory and capable of all self care but unable to carry out any work activities] : Status 2- Ambulatory and capable of all self care but unable to carry out any work activities. Up and about more than 50% of waking hours [Thin] : thin [Normal] : affect appropriate [de-identified] : left mastectomy well healed; right breast no palpable lesions

## 2025-06-30 NOTE — PHYSICAL EXAM
[Ambulatory and capable of all self care but unable to carry out any work activities] : Status 2- Ambulatory and capable of all self care but unable to carry out any work activities. Up and about more than 50% of waking hours [Thin] : thin [Normal] : affect appropriate [de-identified] : left mastectomy well healed; right breast no palpable lesions

## 2025-06-30 NOTE — HISTORY OF PRESENT ILLNESS
[Date: ____________] : Patient's last distress assessment performed on [unfilled]. [1 - Distress Level] : Distress Level: 1 [90: Able to carry normal activity; minor signs or symptoms of disease.] : 90: Able to carry normal activity; minor signs or symptoms of disease.  [ECOG Performance Status: 1 - Restricted in physically strenuous activity but ambulatory and able to carry out work of a light or sedentary nature] : Performance Status: 1 - Restricted in physically strenuous activity but ambulatory and able to carry out work of a light or sedentary nature, e.g., light house work, office work [de-identified] : Patient initilally diagnosed in 2002 with Stage 3A left breast cancer and after mastectomy treated with CAF chemotherapy followed by tamoxifen and anastrozole. In 1/2007 she developed bony metastatic disease and was started on therapy under the care of Dr. Lizbeth Garcia at [facility]. Pathology showed ER+ME+/HER2+ disease. She was initiated on Navelbine and Herceptin with Zometa. In 7/2007, Navelbine was held due to sore legs and she was started on Fulvestrant. At some point exemestane was also started but it is unclear from office notes. Zometa was initially given monthly and then changed to every 3 months. She also had RT to the right femur in 2012 In 2014 she was noted to have cerebellar mass for which she underwent resection and adjuvant radiation therapy. It was decided to maintain her systemic treatment due to lack of systemic progression. In Summer 2015 she developed double vision from cranial nerve paralysis and after eye surgery she has less diplopia.   In May/June 2023 she had fall at home in her apartment in [facility] and found to have fracture of the right clavicle. She spent 6 weeks in rehab facility and as of 7/31/23 moved out of her apartment in [facility] and into assisted living facility ([facility]) in [place]; in 1/2025 transferred to different Assisted living facility in [place] center Transferred her care to  [facility] (formerly [facility]) as of 8/2023  [de-identified] : 6/30/25 Patient returns today to rule out progression or recurrence of breast cancer and to assess treatment toxicity. On Herceptin/Fulvestrant/exemestane since 2007, continues to tolerate well.   In 2/2025, had fall (no fractures) where she was in her bathroom pinned to the ground for several hours; She also contracted COVID; her assisted living facility was on "lock-down" for 7-10 days due to infectious outbreak\ Referred Dr. Jackson to consider surgical repair of her critical AS; planned for later in July 2025 Noted to have drop in Hg due to suspected GI blood loss; has had several hospitalizations. - since last visit, has started taking oral iron supplements  - Bowel movements improved considerably after taking iron supplement; she is also starting to feel more like herself  =================== MRI brain, 4/18/25- IMPRESSION: No evidence of intracranial metastatic disease. Multiple unchanged chronic intracranial findings, as discussed.  MRI brain - 3/2024 BREANNA LAST PET/CT at [facility] in 6/2022; opting to monitor clinically as no change in tumor markers Echo - Last 8/5/2024: EF 56% (stable)%; noted moderate mitral and tricuspid valve regurg; moderate aortic stenosis; mild/moderate aortic regurg as well; seen by Dr. Jackson who recommended f/up every 6 months Zometa - every 6 months; 8/2024, 3/2025 Germline mutation testing - BRCA 1/2 negative in 2007; unsure if updated testing done

## 2025-06-30 NOTE — HISTORY OF PRESENT ILLNESS
[Date: ____________] : Patient's last distress assessment performed on [unfilled]. [1 - Distress Level] : Distress Level: 1 [90: Able to carry normal activity; minor signs or symptoms of disease.] : 90: Able to carry normal activity; minor signs or symptoms of disease.  [ECOG Performance Status: 1 - Restricted in physically strenuous activity but ambulatory and able to carry out work of a light or sedentary nature] : Performance Status: 1 - Restricted in physically strenuous activity but ambulatory and able to carry out work of a light or sedentary nature, e.g., light house work, office work [de-identified] : Patient initilally diagnosed in 2002 with Stage 3A left breast cancer and after mastectomy treated with CAF chemotherapy followed by tamoxifen and anastrozole. In 1/2007 she developed bony metastatic disease and was started on therapy under the care of Dr. Lizbeth Garcia at [facility]. Pathology showed ER+NC+/HER2+ disease. She was initiated on Navelbine and Herceptin with Zometa. In 7/2007, Navelbine was held due to sore legs and she was started on Fulvestrant. At some point exemestane was also started but it is unclear from office notes. Zometa was initially given monthly and then changed to every 3 months. She also had RT to the right femur in 2012 In 2014 she was noted to have cerebellar mass for which she underwent resection and adjuvant radiation therapy. It was decided to maintain her systemic treatment due to lack of systemic progression. In Summer 2015 she developed double vision from cranial nerve paralysis and after eye surgery she has less diplopia.   In May/June 2023 she had fall at home in her apartment in [facility] and found to have fracture of the right clavicle. She spent 6 weeks in rehab facility and as of 7/31/23 moved out of her apartment in [facility] and into assisted living facility ([facility]) in [place]; in 1/2025 transferred to different Assisted living facility in [place] center Transferred her care to  [facility] (formerly [facility] [place]) as of 8/2023  [de-identified] : 6/30/25 Patient returns today to rule out progression or recurrence of breast cancer and to assess treatment toxicity. On Herceptin/Fulvestrant/exemestane since 2007, continues to tolerate well.   In 2/2025, had fall (no fractures) where she was in her bathroom pinned to the ground for several hours; She also contracted COVID; her assisted living facility was on "lock-down" for 7-10 days due to infectious outbreak\ Referred Dr. Jackson to consider surgical repair of her critical AS; planned for later in July 2025 Noted to have drop in Hg due to suspected GI blood loss; has had several hospitalizations. - since last visit, has started taking oral iron supplements  - Bowel movements improved considerably after taking iron supplement; she is also starting to feel more like herself  =================== MRI brain, 4/18/25- IMPRESSION: No evidence of intracranial metastatic disease. Multiple unchanged chronic intracranial findings, as discussed.  MRI brain - 3/2024 BREANNA LAST PET/CT at [facility] in 6/2022; opting to monitor clinically as no change in tumor markers Echo - Last 8/5/2024: EF 56% (stable)%; noted moderate mitral and tricuspid valve regurg; moderate aortic stenosis; mild/moderate aortic regurg as well; seen by Dr. Jackson who recommended f/up every 6 months Zometa - every 6 months; 8/2024, 3/2025 Germline mutation testing - BRCA 1/2 negative in 2007; unsure if updated testing done

## 2025-07-17 NOTE — REVIEW OF SYSTEMS
[Fatigue] : fatigue [Diarrhea: Grade 0] : Diarrhea: Grade 0 [Negative] : Allergic/Immunologic [Abdominal Pain] : no abdominal pain [Constipation] : no constipation [FreeTextEntry2] : weight stable [FreeTextEntry7] : reflux symptoms improved [de-identified] : Memory loss; poor balance [de-identified] : overwhelmed by illnesses (recent flu, critical AS)

## 2025-07-17 NOTE — HISTORY OF PRESENT ILLNESS
[Date: ____________] : Patient's last distress assessment performed on [unfilled]. [1 - Distress Level] : Distress Level: 1 [90: Able to carry normal activity; minor signs or symptoms of disease.] : 90: Able to carry normal activity; minor signs or symptoms of disease.  [ECOG Performance Status: 1 - Restricted in physically strenuous activity but ambulatory and able to carry out work of a light or sedentary nature] : Performance Status: 1 - Restricted in physically strenuous activity but ambulatory and able to carry out work of a light or sedentary nature, e.g., light house work, office work [de-identified] : Patient initilally diagnosed in 2002 with Stage 3A left breast cancer and after mastectomy treated with CAF chemotherapy followed by tamoxifen and anastrozole. In 1/2007 she developed bony metastatic disease and was started on therapy under the care of Dr. Lizbeth Garcia at [facility]. Pathology showed ER+NV+/HER2+ disease. She was initiated on Navelbine and Herceptin with Zometa. In 7/2007, Navelbine was held due to sore legs and she was started on Fulvestrant. At some point exemestane was also started but it is unclear from office notes. Zometa was initially given monthly and then changed to every 3 months. She also had RT to the right femur in 2012 In 2014 she was noted to have cerebellar mass for which she underwent resection and adjuvant radiation therapy. It was decided to maintain her systemic treatment due to lack of systemic progression. In Summer 2015 she developed double vision from cranial nerve paralysis and after eye surgery she has less diplopia.   In May/June 2023 she had fall at home in her apartment in [facility] and found to have fracture of the right clavicle. She spent 6 weeks in rehab facility and as of 7/31/23 moved out of her apartment in [facility] and into assisted living facility ([facility]) in [place]; in 1/2025 transferred to different Assisted living facility in [place] center Transferred her care to  [facility] ([facility] [place]) as of 8/2023  [de-identified] : 7/17/25 Patient returns today to rule out progression or recurrence of breast cancer and to assess treatment toxicity. On Herceptin/Fulvestrant/exemestane since 2007, continues to tolerate well.  She c/o decreased appetite.  She states she lives in assistant living facility and doesn't like the food.  She lost 7 lbs in a month.  She is scheduled for aortic valve replacement next week.   She denies any other complaints.  She denies any cp, sob, n/v, diarrhea, headache, dizziness.   =================== MRI brain, 4/18/25- IMPRESSION: No evidence of intracranial metastatic disease. Multiple unchanged chronic intracranial findings, as discussed.  MRI brain - 3/2024 BREANNA LAST PET/CT at [facility] in 6/2022; opting to monitor clinically as no change in tumor markers Echo - Last 8/5/2024: EF 56% (stable)%; noted moderate mitral and tricuspid valve regurg; moderate aortic stenosis; mild/moderate aortic regurg as well; seen by Dr. Jackson who recommended f/up every 6 months Zometa - every 6 months; 8/2024, 3/2025 Germline mutation testing - BRCA 1/2 negative in 2007; unsure if updated testing done

## 2025-07-17 NOTE — PHYSICAL EXAM
[Ambulatory and capable of all self care but unable to carry out any work activities] : Status 2- Ambulatory and capable of all self care but unable to carry out any work activities. Up and about more than 50% of waking hours [Thin] : thin [Normal] : affect appropriate [de-identified] : left mastectomy well healed, no discrete palpable masses, no palpable axillary nodes.  Right breast with no discrete palpable masses, no palpable axillary nodes.

## 2025-07-17 NOTE — ASSESSMENT
[Palliative] : Goals of care discussed with patient: Palliative [FreeTextEntry1] : 87 yo woman with history of Stage 3A left breast cancer in 2002; developed metastatic disease in 2007 involving bone and cerebellar mets s/p resection in 2014  Metastatic breast cancer - has had stable systemic disease on Herceptin/Fulvestrant/exemestane since 2007 - will continue monthly fulvestrant  - will continue monthly Herceptin  -using medi port without incident - Echo from 3/21/25 with EF 72%; Follows with DR. Frazier.  She is scheduled for aortic valve replacement next week.   - Continue exemestane  - recent Brain MRI 4/2025 with BREANNA; will hold off repeat PET/CT unless clinically significant changes or rise in tumor markers - Zolendronic acid with dose reduction due to renal insufficiency - 3.5mg IV today; to repeat in 9/2025  GERD/Epigastric Pain/iron deficiency - continue pantoprazole; H2 blocker; Gaviscon; stable per patient - f/up with GI on as needed basis - suspected upper GI blood loss with slowly declining Hg level and microcytosis - will plan for IV iron therapy to optimize her for cardiac procedure and potentiate erythrocytosis - scheduled today - will check labs today  - continue monthly treatments for breast cancer - 1 month